# Patient Record
Sex: FEMALE | Race: WHITE | NOT HISPANIC OR LATINO | Employment: STUDENT | URBAN - NONMETROPOLITAN AREA
[De-identification: names, ages, dates, MRNs, and addresses within clinical notes are randomized per-mention and may not be internally consistent; named-entity substitution may affect disease eponyms.]

---

## 2023-01-16 ENCOUNTER — OFFICE VISIT (OUTPATIENT)
Dept: OBSTETRICS AND GYNECOLOGY | Facility: CLINIC | Age: 23
End: 2023-01-16
Payer: MEDICAID

## 2023-01-16 VITALS
BODY MASS INDEX: 22.93 KG/M2 | WEIGHT: 124.6 LBS | HEIGHT: 62 IN | SYSTOLIC BLOOD PRESSURE: 114 MMHG | DIASTOLIC BLOOD PRESSURE: 80 MMHG

## 2023-01-16 DIAGNOSIS — Z12.4 SCREENING FOR CERVICAL CANCER: ICD-10-CM

## 2023-01-16 DIAGNOSIS — Z01.419 WELL WOMAN EXAM WITH ROUTINE GYNECOLOGICAL EXAM: Primary | ICD-10-CM

## 2023-01-16 PROCEDURE — 99385 PREV VISIT NEW AGE 18-39: CPT | Performed by: PHYSICIAN ASSISTANT

## 2023-01-16 RX ORDER — ISOTRETINOIN 40 MG/1
CAPSULE ORAL
COMMUNITY

## 2023-01-16 NOTE — PROGRESS NOTES
"Subjective   Chief Complaint   Patient presents with   • Gynecologic Exam     Patient has never had a pap, No complaints       Verenice Olvera is a 22 y.o. year old  presenting to be seen for her annual gynecological exam.   Has no complaints or concerns  LMP 3 weeks ago  Using condoms for birth control    History reviewed. No pertinent past medical history.     Current Outpatient Medications:   •  ISOtretinoin (ACCUTANE) 40 MG capsule, , Disp: , Rfl:    No Known Allergies   History reviewed. No pertinent surgical history.   Social History     Socioeconomic History   • Marital status: Single   Tobacco Use   • Smoking status: Never   Substance and Sexual Activity   • Alcohol use: Never   • Drug use: Never   • Sexual activity: Yes     Partners: Male     Birth control/protection: Condom      Family History   Problem Relation Age of Onset   • No Known Problems Father    • No Known Problems Mother        Review of Systems   Constitutional: Negative for chills, diaphoresis and fever.   Gastrointestinal: Negative.    Genitourinary: Negative for difficulty urinating, dysuria, menstrual problem, pelvic pain and vaginal bleeding.           Objective   /80   Ht 157.5 cm (62\")   Wt 56.5 kg (124 lb 9.6 oz)   LMP 2022 (Approximate)   BMI 22.79 kg/m²     Physical Exam  Exam conducted with a chaperone present.   Constitutional:       Appearance: Normal appearance. She is well-developed and well-groomed.   Eyes:      General: Lids are normal.      Extraocular Movements: Extraocular movements intact.      Conjunctiva/sclera: Conjunctivae normal.   Chest:   Breasts:     Right: No inverted nipple, mass, nipple discharge, skin change or tenderness.      Left: No inverted nipple, mass, nipple discharge, skin change or tenderness.   Genitourinary:     Labia:         Right: No rash, tenderness or lesion.         Left: No rash, tenderness or lesion.       Urethra: No prolapse, urethral pain, urethral swelling or " urethral lesion.      Vagina: No vaginal discharge, tenderness or lesions.      Cervix: No cervical motion tenderness, discharge, friability or lesion.      Uterus: Not enlarged and not tender.       Adnexa:         Right: No mass or tenderness.          Left: No mass or tenderness.     Lymphadenopathy:      Upper Body:      Right upper body: No axillary adenopathy.      Left upper body: No axillary adenopathy.   Skin:     General: Skin is warm and dry.      Findings: No bruising or lesion.   Neurological:      General: No focal deficit present.      Mental Status: She is alert and oriented to person, place, and time.   Psychiatric:         Attention and Perception: Attention normal.         Mood and Affect: Mood normal.         Speech: Speech normal.         Behavior: Behavior is cooperative.            Result Review :                   Assessment and Plan  Diagnoses and all orders for this visit:    1. Well woman exam with routine gynecological exam (Primary)    2. Screening for cervical cancer  -     LIQUID-BASED PAP SMEAR, [facility] ([facility])      There are no Patient Instructions on file for this visit.           This note was electronically signed.    Nika Mcdonnell PA-C   January 16, 2023

## 2023-01-19 LAB — REF LAB TEST METHOD: NORMAL

## 2023-01-20 ENCOUNTER — TELEPHONE (OUTPATIENT)
Dept: OBSTETRICS AND GYNECOLOGY | Facility: CLINIC | Age: 23
End: 2023-01-20
Payer: COMMERCIAL

## 2023-01-30 ENCOUNTER — OFFICE VISIT (OUTPATIENT)
Dept: OBSTETRICS AND GYNECOLOGY | Facility: CLINIC | Age: 23
End: 2023-01-30
Payer: COMMERCIAL

## 2023-01-30 VITALS
BODY MASS INDEX: 22.78 KG/M2 | DIASTOLIC BLOOD PRESSURE: 80 MMHG | SYSTOLIC BLOOD PRESSURE: 120 MMHG | HEIGHT: 62 IN | WEIGHT: 123.8 LBS

## 2023-01-30 DIAGNOSIS — R87.613 HIGH GRADE SQUAMOUS INTRAEPITHELIAL LESION (HGSIL) ON CYTOLOGIC SMEAR OF CERVIX: Primary | ICD-10-CM

## 2023-01-30 LAB
B-HCG UR QL: NEGATIVE
EXPIRATION DATE: NORMAL
INTERNAL NEGATIVE CONTROL: NEGATIVE
INTERNAL POSITIVE CONTROL: POSITIVE
Lab: NORMAL

## 2023-01-30 PROCEDURE — 81025 URINE PREGNANCY TEST: CPT | Performed by: PHYSICIAN ASSISTANT

## 2023-01-30 PROCEDURE — 57454 BX/CURETT OF CERVIX W/SCOPE: CPT | Performed by: PHYSICIAN ASSISTANT

## 2023-01-30 NOTE — PROGRESS NOTES
Colposcopy    Date of procedure:  1/30/2023    Risks and benefits discussed? yes  All questions answered? yes  Consents given by the patient  Written consent obtained? yes    Pre-op indication: HGSIL- moderate and severe dysplasia  Procedure documentation:    The cervix was bathed in acetic acid.   The findings were as follows:      The transformation zone was able to be seen adequately.    acetowhite noted at 11 oclock and 4 oclock     Ectocervical biopsies were taken from 11 o'clock and 4 oclock.      An ECC was performed.                            Monsels solution tampon was applied to the biopsy sites.    Colposcopic Impression: 1. Adequate colposcopy  2. Colposcopic findings are inconsistent with PAP       Plan: Will base further treatment on pathology results.     Patient instructions:        Remove tampon in 2 hours.  Pelvic rest/no intercourse for one week. Call office if any heavy bleeding, vaginal odor, pelvic pain, cramping, fever or chills or any other concerns.       This note was electronically signed.    Nika Mcdonnell PA-C  January 30, 2023

## 2023-02-01 ENCOUNTER — PREP FOR SURGERY (OUTPATIENT)
Dept: OTHER | Facility: HOSPITAL | Age: 23
End: 2023-02-01
Payer: COMMERCIAL

## 2023-02-01 DIAGNOSIS — R87.613 HIGH GRADE SQUAMOUS INTRAEPITHELIAL CERVICAL DYSPLASIA: Primary | ICD-10-CM

## 2023-02-01 LAB — REF LAB TEST METHOD: NORMAL

## 2023-02-01 RX ORDER — SODIUM CHLORIDE 9 MG/ML
40 INJECTION, SOLUTION INTRAVENOUS AS NEEDED
Status: CANCELLED | OUTPATIENT
Start: 2023-02-01

## 2023-02-01 RX ORDER — SODIUM CHLORIDE 0.9 % (FLUSH) 0.9 %
10 SYRINGE (ML) INJECTION AS NEEDED
Status: CANCELLED | OUTPATIENT
Start: 2023-02-01

## 2023-02-01 RX ORDER — SODIUM CHLORIDE 0.9 % (FLUSH) 0.9 %
3 SYRINGE (ML) INJECTION EVERY 12 HOURS SCHEDULED
Status: CANCELLED | OUTPATIENT
Start: 2023-02-01

## 2023-02-01 NOTE — PROGRESS NOTES
Patient is informed of results of cervical biopsy confirming high-grade dysplasia.  Patient is advised of the recommendation for LEEP for treatment of high-grade dysplasia.  She voices understanding.  Case request is placed.

## 2023-02-03 PROBLEM — R87.613 HIGH GRADE SQUAMOUS INTRAEPITHELIAL CERVICAL DYSPLASIA: Status: ACTIVE | Noted: 2023-02-03

## 2023-02-14 ENCOUNTER — TELEPHONE (OUTPATIENT)
Dept: PREADMISSION TESTING | Facility: HOSPITAL | Age: 23
End: 2023-02-14

## 2023-02-21 ENCOUNTER — TELEPHONE (OUTPATIENT)
Dept: PREADMISSION TESTING | Facility: HOSPITAL | Age: 23
End: 2023-02-21

## 2023-02-22 ENCOUNTER — PRE-ADMISSION TESTING (OUTPATIENT)
Dept: PREADMISSION TESTING | Facility: HOSPITAL | Age: 23
End: 2023-02-22
Payer: COMMERCIAL

## 2023-02-22 VITALS — WEIGHT: 127 LBS | HEIGHT: 62 IN | BODY MASS INDEX: 23.37 KG/M2

## 2023-02-22 DIAGNOSIS — R87.613 HIGH GRADE SQUAMOUS INTRAEPITHELIAL CERVICAL DYSPLASIA: ICD-10-CM

## 2023-02-22 LAB
BASOPHILS # BLD AUTO: 0.05 10*3/MM3 (ref 0–0.2)
BASOPHILS NFR BLD AUTO: 1 % (ref 0–1.5)
BILIRUB UR QL STRIP: NEGATIVE
CLARITY UR: CLEAR
COLOR UR: YELLOW
DEPRECATED RDW RBC AUTO: 39.5 FL (ref 37–54)
EOSINOPHIL # BLD AUTO: 0.05 10*3/MM3 (ref 0–0.4)
EOSINOPHIL NFR BLD AUTO: 1 % (ref 0.3–6.2)
ERYTHROCYTE [DISTWIDTH] IN BLOOD BY AUTOMATED COUNT: 12.5 % (ref 12.3–15.4)
GLUCOSE UR STRIP-MCNC: NEGATIVE MG/DL
HCT VFR BLD AUTO: 35.4 % (ref 34–46.6)
HGB BLD-MCNC: 12.2 G/DL (ref 12–15.9)
HGB UR QL STRIP.AUTO: NEGATIVE
IMM GRANULOCYTES # BLD AUTO: 0.01 10*3/MM3 (ref 0–0.05)
IMM GRANULOCYTES NFR BLD AUTO: 0.2 % (ref 0–0.5)
KETONES UR QL STRIP: NEGATIVE
LEUKOCYTE ESTERASE UR QL STRIP.AUTO: NEGATIVE
LYMPHOCYTES # BLD AUTO: 1.82 10*3/MM3 (ref 0.7–3.1)
LYMPHOCYTES NFR BLD AUTO: 38.1 % (ref 19.6–45.3)
MCH RBC QN AUTO: 29.8 PG (ref 26.6–33)
MCHC RBC AUTO-ENTMCNC: 34.5 G/DL (ref 31.5–35.7)
MCV RBC AUTO: 86.3 FL (ref 79–97)
MONOCYTES # BLD AUTO: 0.5 10*3/MM3 (ref 0.1–0.9)
MONOCYTES NFR BLD AUTO: 10.5 % (ref 5–12)
NEUTROPHILS NFR BLD AUTO: 2.35 10*3/MM3 (ref 1.7–7)
NEUTROPHILS NFR BLD AUTO: 49.2 % (ref 42.7–76)
NITRITE UR QL STRIP: NEGATIVE
NRBC BLD AUTO-RTO: 0 /100 WBC (ref 0–0.2)
PH UR STRIP.AUTO: 7 [PH] (ref 5–8)
PLATELET # BLD AUTO: 235 10*3/MM3 (ref 140–450)
PMV BLD AUTO: 9.3 FL (ref 6–12)
PROT UR QL STRIP: NEGATIVE
RBC # BLD AUTO: 4.1 10*6/MM3 (ref 3.77–5.28)
SP GR UR STRIP: 1.01 (ref 1–1.03)
UROBILINOGEN UR QL STRIP: NORMAL
WBC NRBC COR # BLD: 4.78 10*3/MM3 (ref 3.4–10.8)

## 2023-02-22 PROCEDURE — 85025 COMPLETE CBC W/AUTO DIFF WBC: CPT

## 2023-02-22 PROCEDURE — 36415 COLL VENOUS BLD VENIPUNCTURE: CPT

## 2023-02-22 PROCEDURE — 81003 URINALYSIS AUTO W/O SCOPE: CPT

## 2023-02-22 NOTE — DISCHARGE INSTRUCTIONS
Introduction to anesthesia video viewed by pt in PAT.   PAT PASS GIVEN/REVIEWED WITH PT.  VERBALIZED UNDERSTANDING OF THE FOLLOWING:  DO NOT EAT, DRINK, SMOKE, USE SMOKELESS TOBACCO OR CHEW GUM AFTER MIDNIGHT THE NIGHT BEFORE SURGERY.  THIS ALSO INCLUDES HARD CANDIES AND MINTS.    DO NOT SHAVE THE AREA TO BE OPERATED ON AT LEAST 48 HOURS PRIOR TO THE PROCEDURE.  DO NOT WEAR MAKE UP OR NAIL POLISH.  DO NOT LEAVE IN ANY PIERCING OR WEAR JEWELRY THE DAY OF SURGERY.      DO NOT USE ADHESIVES IF YOU WEAR DENTURES.    DO NOT WEAR EYE CONTACTS; BRING IN YOUR GLASSES.    ONLY TAKE MEDICATION THE MORNING OF YOUR PROCEDURE IF INSTRUCTED BY YOUR SURGEON WITH ENOUGH WATER TO SWALLOW THE MEDICATION.  IF YOUR SURGEON DID NOT SPECIFY WHICH MEDICATIONS TO TAKE, YOU WILL NEED TO CALL THEIR OFFICE FOR FURTHER INSTRUCTIONS AND DO AS THEY INSTRUCT.    LEAVE ANYTHING YOU CONSIDER VALUABLE AT HOME.    YOU WILL NEED TO ARRANGE FOR SOMEONE TO DRIVE YOU HOME AFTER SURGERY.  IT IS RECOMMENDED THAT YOU DO NOT DRIVE, WORK, DRINK ALCOHOL OR MAKE MAJOR DECISIONS FOR AT LEAST 24 HOURS AFTER YOUR PROCEDURE IS COMPLETE.      THE DAY OF YOUR PROCEDURE, BRING IN THE FOLLOWING IF APPLICABLE:   PICTURE ID AND INSURANCE/MEDICARE OR MEDICAID CARDS/ANY CO-PAY THAT MAY BE DUE   COPY OF ADVANCED DIRECTIVE/LIVING WILL/POWER OR    CPAP/BIPAP/INHALERS   SKIN PREP SHEET   YOUR PREADMISSION TESTING PASS (IF NOT A PHONE HISTORY)       PAT PASS GIVEN/REVIEWED WITH PT.  VERBALIZED UNDERSTANDING OF THE FOLLOWING:  DO NOT EAT, DRINK, SMOKE, USE SMOKELESS TOBACCO OR CHEW GUM AFTER MIDNIGHT THE NIGHT BEFORE SURGERY.  THIS ALSO INCLUDES HARD CANDIES AND MINTS.    DO NOT SHAVE THE AREA TO BE OPERATED ON AT LEAST 48 HOURS PRIOR TO THE PROCEDURE.  DO NOT WEAR MAKE UP OR NAIL POLISH.  DO NOT LEAVE IN ANY PIERCING OR WEAR JEWELRY THE DAY OF SURGERY.      DO NOT USE ADHESIVES IF YOU WEAR DENTURES.    DO NOT WEAR EYE CONTACTS; BRING IN YOUR GLASSES.    ONLY TAKE  MEDICATION THE MORNING OF YOUR PROCEDURE IF INSTRUCTED BY YOUR SURGEON WITH ENOUGH WATER TO SWALLOW THE MEDICATION.  IF YOUR SURGEON DID NOT SPECIFY WHICH MEDICATIONS TO TAKE, YOU WILL NEED TO CALL THEIR OFFICE FOR FURTHER INSTRUCTIONS AND DO AS THEY INSTRUCT.    LEAVE ANYTHING YOU CONSIDER VALUABLE AT HOME.    YOU WILL NEED TO ARRANGE FOR SOMEONE TO DRIVE YOU HOME AFTER SURGERY.  IT IS RECOMMENDED THAT YOU DO NOT DRIVE, WORK, DRINK ALCOHOL OR MAKE MAJOR DECISIONS FOR AT LEAST 24 HOURS AFTER YOUR PROCEDURE IS COMPLETE.      THE DAY OF YOUR PROCEDURE, BRING IN THE FOLLOWING IF APPLICABLE:   PICTURE ID AND INSURANCE/MEDICARE OR MEDICAID CARDS/ANY CO-PAY THAT MAY BE DUE   COPY OF ADVANCED DIRECTIVE/LIVING WILL/POWER OR    CPAP/BIPAP/INHALERS   SKIN PREP SHEET   YOUR PREADMISSION TESTING PASS (IF NOT A PHONE HISTORY)

## 2023-02-28 PROCEDURE — S0260 H&P FOR SURGERY: HCPCS | Performed by: OBSTETRICS & GYNECOLOGY

## 2023-03-19 PROCEDURE — S0260 H&P FOR SURGERY: HCPCS | Performed by: OBSTETRICS & GYNECOLOGY

## 2023-03-20 NOTE — H&P
Alexander Olvera  : 2000  MRN: [id number]  CSN: [id number]    History and Physical  Subjective   Verenice Olvera is a 22 y.o. year old  who presents for surgery due to cervical dysplasia with high-grade lesion.  Patient has been seen in the office.  She had a Pap smear showing a high-grade lesion.  Colposcopy and biopsy was performed.  Her pathology returned with a high-grade lesion.  Patient is here for a LEEP procedure.    History  Past Medical History:   Diagnosis Date   • Abnormal Pap smear of cervix      No current facility-administered medications on file prior to encounter.     Current Outpatient Medications on File Prior to Encounter   Medication Sig Dispense Refill   • ISOtretinoin (ACCUTANE) 40 MG capsule        No Known Allergies  Past Surgical History:   Procedure Laterality Date   • WISDOM TOOTH EXTRACTION  14     Family History   Problem Relation Age of Onset   • No Known Problems Father    • No Known Problems Mother      Social History     Socioeconomic History   • Marital status: Single   Tobacco Use   • Smoking status: Never   Vaping Use   • Vaping Use: Never used   Substance and Sexual Activity   • Alcohol use: Never   • Drug use: Never   • Sexual activity: Defer     Partners: Male     Review of Systems  The following systems were reviewed and negative:  constitution, eyes, ENT, respiratory, cardiovascular, gastrointestinal, genitourinary, integument, breast, hematologic / lymphatic, musculoskeletal, neurological, behavioral/psych, endocrine and allergies / immunologic    Objective  Recent Vitals       2023          BP: 114/80 120/80      Weight: 56.5 kg (124 lb 9.6 oz) 56.2 kg (123 lb 12.8 oz)      BMI (Calculated): 22.8 22.6          Physical Exam:  General Appearance:  Alert and cooperative, not in any acute distress.   Head:  Atraumatic and normocephalic, without obvious abnormality.   Eyes:          PERRLA, conjunctivae and sclerae normal, no  Icterus. No pallor. Extraocular movements are within normal limits.   Ears:  Ears appear intact with no abnormalities noted.   Throat: No oral lesions, no thrush, oral mucosa moist.   Neck: Supple, trachea midline, no thyromegaly, no carotid bruit.   Back:   No kyphoscoliosis present. No tenderness to palpation,   range of motion normal.  No CVAT.   Lungs:   Chest shape is normal. Breath sounds heard bilaterally equally.  No crackles or wheezing. No Pleural rub or bronchial breathing. Normal respiratory effort.   Heart:  Normal S1 and S2, no murmur, no gallop, no rub. No JVD.   Abdomen:   Normal bowel sounds, no masses, no hepatomegaly, no splenomegaly. Soft, non-tender, no guarding, no rebound tenderness.   Extremities: Moves all extremities well, no edema, no cyanosis, no clubbing.  Normal range of motion. Normal strength and tone.   Skin: No bleeding, bruising or rash. No palpable masses noted or induration.   Psychiatric: Alert and oriented  to time, place, and person. Appropriate mood and affect. Thought content organized and appropriate judgment.       Recent Labs  Lab Results (last 7 days)     ** No results found for the last 168 hours. **           Recent Imaging  No radiology results for the last 10 days        Assessment   1. Abnormal Pap smear  2. High-grade cervical dysplasia     Plan   1. LEEP procedure of the cervix.  2. ABx and DVT prophylaxis as indicated.  3. Risks, complications, benefits, and other alternatives discussed.  4. All questions answered and pt in agreement with plan.    Maria A Alfredo M.D.  3/19/2023  20:51 EDT

## 2023-03-21 ENCOUNTER — HOSPITAL ENCOUNTER (OUTPATIENT)
Facility: HOSPITAL | Age: 23
Setting detail: HOSPITAL OUTPATIENT SURGERY
Discharge: HOME | End: 2023-03-21
Attending: OBSTETRICS & GYNECOLOGY | Admitting: OBSTETRICS & GYNECOLOGY
Payer: COMMERCIAL

## 2023-03-21 ENCOUNTER — ANESTHESIA EVENT (OUTPATIENT)
Dept: PERIOP | Facility: HOSPITAL | Age: 23
End: 2023-03-21
Payer: COMMERCIAL

## 2023-03-21 ENCOUNTER — ANESTHESIA (OUTPATIENT)
Dept: PERIOP | Facility: HOSPITAL | Age: 23
End: 2023-03-21
Payer: COMMERCIAL

## 2023-03-21 VITALS
RESPIRATION RATE: 18 BRPM | SYSTOLIC BLOOD PRESSURE: 123 MMHG | TEMPERATURE: 97.6 F | DIASTOLIC BLOOD PRESSURE: 75 MMHG | OXYGEN SATURATION: 100 % | HEART RATE: 80 BPM

## 2023-03-21 PROCEDURE — G0463 HOSPITAL OUTPT CLINIC VISIT: HCPCS | Performed by: OBSTETRICS & GYNECOLOGY

## 2023-03-21 PROCEDURE — 81025 URINE PREGNANCY TEST: CPT | Performed by: OBSTETRICS & GYNECOLOGY

## 2023-03-21 RX ORDER — SODIUM CHLORIDE, SODIUM LACTATE, POTASSIUM CHLORIDE, CALCIUM CHLORIDE 600; 310; 30; 20 MG/100ML; MG/100ML; MG/100ML; MG/100ML
1000 INJECTION, SOLUTION INTRAVENOUS CONTINUOUS
Status: DISCONTINUED | OUTPATIENT
Start: 2023-03-21 | End: 2023-03-21 | Stop reason: HOSPADM

## 2023-03-21 RX ORDER — SODIUM CHLORIDE 0.9 % (FLUSH) 0.9 %
3 SYRINGE (ML) INJECTION EVERY 12 HOURS SCHEDULED
Status: DISCONTINUED | OUTPATIENT
Start: 2023-03-21 | End: 2023-03-21 | Stop reason: HOSPADM

## 2023-03-21 RX ORDER — SODIUM CHLORIDE 9 MG/ML
40 INJECTION, SOLUTION INTRAVENOUS AS NEEDED
Status: DISCONTINUED | OUTPATIENT
Start: 2023-03-21 | End: 2023-03-21 | Stop reason: HOSPADM

## 2023-03-21 RX ORDER — SODIUM CHLORIDE 0.9 % (FLUSH) 0.9 %
10 SYRINGE (ML) INJECTION AS NEEDED
Status: DISCONTINUED | OUTPATIENT
Start: 2023-03-21 | End: 2023-03-21 | Stop reason: HOSPADM

## 2023-03-21 RX ADMIN — SODIUM CHLORIDE, POTASSIUM CHLORIDE, SODIUM LACTATE AND CALCIUM CHLORIDE 1000 ML: 600; 310; 30; 20 INJECTION, SOLUTION INTRAVENOUS at 08:57

## 2023-03-21 NOTE — ANESTHESIA PREPROCEDURE EVALUATION
Anesthesia Evaluation     Patient summary reviewed and Nursing notes reviewed   NPO Solid Status: > 8 hours  NPO Liquid Status: > 8 hours           Airway   Mallampati: II  TM distance: >3 FB  Neck ROM: full  Possible difficult intubation  Dental - normal exam     Pulmonary - negative pulmonary ROS and normal exam   Cardiovascular - negative cardio ROS and normal exam        Neuro/Psych- negative ROS  GI/Hepatic/Renal/Endo - negative ROS     Musculoskeletal (-) negative ROS    Abdominal  - normal exam   Substance History - negative use     OB/GYN negative ob/gyn ROS         Other - negative ROS                       Anesthesia Plan    ASA 1     MAC     intravenous induction     Pre-procedure education provided  Plan discussed with CRNA.        CODE STATUS:

## 2023-03-21 NOTE — NURSING NOTE
1114 - MD at bedside, discussed with pt the schedule is taking longer than expected today.  Pt decided to reschedule procedure and is aware to call office for new date.  IV removed.    1135 - pt DC with boyfriend and belongings.  No questions per pt. S Barbara RN

## 2023-03-24 ENCOUNTER — PREP FOR SURGERY (OUTPATIENT)
Dept: OTHER | Facility: HOSPITAL | Age: 23
End: 2023-03-24
Payer: COMMERCIAL

## 2023-03-24 DIAGNOSIS — R87.613 HIGH GRADE SQUAMOUS INTRAEPITHELIAL CERVICAL DYSPLASIA: Primary | ICD-10-CM

## 2023-03-24 RX ORDER — SODIUM CHLORIDE 9 MG/ML
40 INJECTION, SOLUTION INTRAVENOUS AS NEEDED
OUTPATIENT
Start: 2023-03-24

## 2023-03-24 RX ORDER — SODIUM CHLORIDE 0.9 % (FLUSH) 0.9 %
3 SYRINGE (ML) INJECTION EVERY 12 HOURS SCHEDULED
OUTPATIENT
Start: 2023-03-24

## 2023-03-24 RX ORDER — SODIUM CHLORIDE 0.9 % (FLUSH) 0.9 %
10 SYRINGE (ML) INJECTION AS NEEDED
OUTPATIENT
Start: 2023-03-24

## 2023-04-13 ENCOUNTER — PRE-ADMISSION TESTING (OUTPATIENT)
Dept: PREADMISSION TESTING | Facility: HOSPITAL | Age: 23
End: 2023-04-13
Payer: COMMERCIAL

## 2023-04-13 VITALS — HEIGHT: 62 IN | WEIGHT: 127.8 LBS | BODY MASS INDEX: 23.52 KG/M2

## 2023-04-13 DIAGNOSIS — R87.613 HIGH GRADE SQUAMOUS INTRAEPITHELIAL CERVICAL DYSPLASIA: ICD-10-CM

## 2023-04-13 LAB
BASOPHILS # BLD AUTO: 0.04 10*3/MM3 (ref 0–0.2)
BASOPHILS NFR BLD AUTO: 0.7 % (ref 0–1.5)
BILIRUB UR QL STRIP: NEGATIVE
CLARITY UR: CLEAR
COLOR UR: YELLOW
DEPRECATED RDW RBC AUTO: 41.3 FL (ref 37–54)
EOSINOPHIL # BLD AUTO: 0.07 10*3/MM3 (ref 0–0.4)
EOSINOPHIL NFR BLD AUTO: 1.2 % (ref 0.3–6.2)
ERYTHROCYTE [DISTWIDTH] IN BLOOD BY AUTOMATED COUNT: 12.6 % (ref 12.3–15.4)
GLUCOSE UR STRIP-MCNC: NEGATIVE MG/DL
HCT VFR BLD AUTO: 41.3 % (ref 34–46.6)
HGB BLD-MCNC: 13.8 G/DL (ref 12–15.9)
HGB UR QL STRIP.AUTO: NEGATIVE
IMM GRANULOCYTES # BLD AUTO: 0.01 10*3/MM3 (ref 0–0.05)
IMM GRANULOCYTES NFR BLD AUTO: 0.2 % (ref 0–0.5)
KETONES UR QL STRIP: NEGATIVE
LEUKOCYTE ESTERASE UR QL STRIP.AUTO: NEGATIVE
LYMPHOCYTES # BLD AUTO: 2.2 10*3/MM3 (ref 0.7–3.1)
LYMPHOCYTES NFR BLD AUTO: 37.4 % (ref 19.6–45.3)
MCH RBC QN AUTO: 30.4 PG (ref 26.6–33)
MCHC RBC AUTO-ENTMCNC: 33.4 G/DL (ref 31.5–35.7)
MCV RBC AUTO: 91 FL (ref 79–97)
MONOCYTES # BLD AUTO: 0.54 10*3/MM3 (ref 0.1–0.9)
MONOCYTES NFR BLD AUTO: 9.2 % (ref 5–12)
NEUTROPHILS NFR BLD AUTO: 3.02 10*3/MM3 (ref 1.7–7)
NEUTROPHILS NFR BLD AUTO: 51.3 % (ref 42.7–76)
NITRITE UR QL STRIP: NEGATIVE
NRBC BLD AUTO-RTO: 0 /100 WBC (ref 0–0.2)
PH UR STRIP.AUTO: 7 [PH] (ref 5–8)
PLATELET # BLD AUTO: 236 10*3/MM3 (ref 140–450)
PMV BLD AUTO: 9.6 FL (ref 6–12)
PROT UR QL STRIP: NEGATIVE
RBC # BLD AUTO: 4.54 10*6/MM3 (ref 3.77–5.28)
SP GR UR STRIP: 1.02 (ref 1–1.03)
UROBILINOGEN UR QL STRIP: NORMAL
WBC NRBC COR # BLD: 5.88 10*3/MM3 (ref 3.4–10.8)

## 2023-04-13 PROCEDURE — 81003 URINALYSIS AUTO W/O SCOPE: CPT

## 2023-04-13 PROCEDURE — 36415 COLL VENOUS BLD VENIPUNCTURE: CPT

## 2023-04-13 PROCEDURE — 85025 COMPLETE CBC W/AUTO DIFF WBC: CPT

## 2023-04-26 PROCEDURE — S0260 H&P FOR SURGERY: HCPCS | Performed by: OBSTETRICS & GYNECOLOGY

## 2023-04-26 NOTE — H&P
SEB Alexander Olvera  : 2000  MRN: [id number]  [facility]: [id number]    History and Physical  Subjective   Verenice Olvera is a 22 y.o. year old  who presents for surgery due to cervical dysplasia with high-grade lesion.  Patient had colposcopy and biopsy performed secondary to a high-grade Pap smear.  Her pathology returned high-grade.  She is here for a LEEP procedure.  Her ECC was negative.    History  Past Medical History:   Diagnosis Date   • Abnormal Pap smear of cervix    • Acne      No current facility-administered medications on file prior to encounter.     Current Outpatient Medications on File Prior to Encounter   Medication Sig Dispense Refill   • ISOtretinoin (ACCUTANE) 40 MG capsule Take 1 capsule by mouth Daily As Needed.       No Known Allergies  Past Surgical History:   Procedure Laterality Date   • WISDOM TOOTH EXTRACTION  14     Family History   Problem Relation Age of Onset   • No Known Problems Father    • No Known Problems Mother      Social History     Socioeconomic History   • Marital status: Single   Tobacco Use   • Smoking status: Never   • Smokeless tobacco: Never   Vaping Use   • Vaping Use: Never used   Substance and Sexual Activity   • Alcohol use: Never   • Drug use: Never   • Sexual activity: Defer     Review of Systems  The following systems were reviewed and negative:  constitution, eyes, ENT, respiratory, cardiovascular, gastrointestinal, genitourinary, integument, breast, hematologic / lymphatic, musculoskeletal, neurological, behavioral/psych, endocrine and allergies / immunologic    Objective  Recent Vitals       2023 2023 3/21/2023       BP: 120/80 -- 123/75     Pulse: -- -- 80     Temp: -- -- 97.6 °F (36.4 °C)     Weight: 56.2 kg (123 lb 12.8 oz) 57.6 kg (127 lb) --     BMI (Calculated): 22.6 23.2 --         Physical Exam:  General Appearance:  Alert and cooperative, not in any acute distress.   Head:  Atraumatic and  normocephalic, without obvious abnormality.   Eyes:          PERRLA, conjunctivae and sclerae normal, no Icterus. No pallor. Extraocular movements are within normal limits.   Ears:  Ears appear intact with no abnormalities noted.   Throat: No oral lesions, no thrush, oral mucosa moist.   Neck: Supple, trachea midline, no thyromegaly, no carotid bruit.   Back:   No kyphoscoliosis present. No tenderness to palpation,   range of motion normal.  No CVAT.   Lungs:   Chest shape is normal. Breath sounds heard bilaterally equally.  No crackles or wheezing. No Pleural rub or bronchial breathing. Normal respiratory effort.   Heart:  Normal S1 and S2, no murmur, no gallop, no rub. No JVD.   Abdomen:   Normal bowel sounds, no masses, no hepatomegaly, no splenomegaly. Soft, non-tender, no guarding, no rebound tenderness.   Extremities: Moves all extremities well, no edema, no cyanosis, no clubbing.  Normal range of motion. Normal strength and tone.   Skin: No bleeding, bruising or rash. No palpable masses noted or induration.   Psychiatric: Alert and oriented  to time, place, and person. Appropriate mood and affect. Thought content organized and appropriate judgment.       Recent Labs  Lab Results (last 7 days)     ** No results found for the last 168 hours. **           Recent Imaging  No radiology results for the last 10 days        Assessment   1. High-grade cervical dysplasia     Plan   1. LEEP procedure.  2. ABx and DVT prophylaxis as indicated.  3. Risks, complications, benefits, and other alternatives discussed.  4. All questions answered and pt in agreement with plan.    Maria A Alfredo M.D.  4/26/2023  07:30 EDT

## 2023-04-27 ENCOUNTER — HOSPITAL ENCOUNTER (OUTPATIENT)
Facility: HOSPITAL | Age: 23
Setting detail: HOSPITAL OUTPATIENT SURGERY
Discharge: HOME | End: 2023-04-27
Attending: OBSTETRICS & GYNECOLOGY | Admitting: OBSTETRICS & GYNECOLOGY
Payer: COMMERCIAL

## 2023-04-27 ENCOUNTER — ANESTHESIA EVENT (OUTPATIENT)
Dept: PERIOP | Facility: HOSPITAL | Age: 23
End: 2023-04-27
Payer: COMMERCIAL

## 2023-04-27 ENCOUNTER — ANESTHESIA (OUTPATIENT)
Dept: PERIOP | Facility: HOSPITAL | Age: 23
End: 2023-04-27
Payer: COMMERCIAL

## 2023-04-27 VITALS
RESPIRATION RATE: 18 BRPM | OXYGEN SATURATION: 100 % | DIASTOLIC BLOOD PRESSURE: 88 MMHG | HEART RATE: 85 BPM | SYSTOLIC BLOOD PRESSURE: 124 MMHG | TEMPERATURE: 97.7 F

## 2023-04-27 DIAGNOSIS — R87.613 HIGH GRADE SQUAMOUS INTRAEPITHELIAL CERVICAL DYSPLASIA: ICD-10-CM

## 2023-04-27 LAB
B-HCG UR QL: NEGATIVE
EXPIRATION DATE: NORMAL
INTERNAL NEGATIVE CONTROL: NORMAL
INTERNAL POSITIVE CONTROL: NORMAL
Lab: NORMAL

## 2023-04-27 PROCEDURE — 81025 URINE PREGNANCY TEST: CPT | Performed by: OBSTETRICS & GYNECOLOGY

## 2023-04-27 PROCEDURE — 25010000002 ONDANSETRON PER 1 MG: Performed by: NURSE ANESTHETIST, CERTIFIED REGISTERED

## 2023-04-27 PROCEDURE — 25010000002 DEXAMETHASONE PER 1 MG: Performed by: NURSE ANESTHETIST, CERTIFIED REGISTERED

## 2023-04-27 PROCEDURE — 0 LIDOCAINE 1 % SOLUTION: Performed by: OBSTETRICS & GYNECOLOGY

## 2023-04-27 PROCEDURE — 57522 CONIZATION OF CERVIX: CPT | Performed by: OBSTETRICS & GYNECOLOGY

## 2023-04-27 PROCEDURE — 25010000002 PROPOFOL 10 MG/ML EMULSION: Performed by: NURSE ANESTHETIST, CERTIFIED REGISTERED

## 2023-04-27 PROCEDURE — 25010000002 MIDAZOLAM PER 1MG: Performed by: NURSE ANESTHETIST, CERTIFIED REGISTERED

## 2023-04-27 PROCEDURE — 25010000002 KETOROLAC TROMETHAMINE PER 15 MG: Performed by: NURSE ANESTHETIST, CERTIFIED REGISTERED

## 2023-04-27 PROCEDURE — 25010000002 FENTANYL CITRATE (PF) 50 MCG/ML SOLUTION: Performed by: NURSE ANESTHETIST, CERTIFIED REGISTERED

## 2023-04-27 RX ORDER — LIDOCAINE HYDROCHLORIDE 20 MG/ML
INJECTION, SOLUTION INTRAVENOUS AS NEEDED
Status: DISCONTINUED | OUTPATIENT
Start: 2023-04-27 | End: 2023-04-27 | Stop reason: SURG

## 2023-04-27 RX ORDER — SODIUM CHLORIDE, SODIUM LACTATE, POTASSIUM CHLORIDE, CALCIUM CHLORIDE 600; 310; 30; 20 MG/100ML; MG/100ML; MG/100ML; MG/100ML
1000 INJECTION, SOLUTION INTRAVENOUS CONTINUOUS
Status: DISCONTINUED | OUTPATIENT
Start: 2023-04-27 | End: 2023-04-27 | Stop reason: HOSPADM

## 2023-04-27 RX ORDER — SODIUM CHLORIDE 0.9 % (FLUSH) 0.9 %
3 SYRINGE (ML) INJECTION EVERY 12 HOURS SCHEDULED
Status: DISCONTINUED | OUTPATIENT
Start: 2023-04-27 | End: 2023-04-27 | Stop reason: HOSPADM

## 2023-04-27 RX ORDER — SODIUM CHLORIDE 0.9 % (FLUSH) 0.9 %
10 SYRINGE (ML) INJECTION AS NEEDED
Status: DISCONTINUED | OUTPATIENT
Start: 2023-04-27 | End: 2023-04-27 | Stop reason: HOSPADM

## 2023-04-27 RX ORDER — IBUPROFEN 800 MG/1
800 TABLET ORAL EVERY 8 HOURS PRN
Qty: 30 TABLET | Refills: 0 | Status: SHIPPED | OUTPATIENT
Start: 2023-04-27

## 2023-04-27 RX ORDER — ONDANSETRON 2 MG/ML
INJECTION INTRAMUSCULAR; INTRAVENOUS AS NEEDED
Status: DISCONTINUED | OUTPATIENT
Start: 2023-04-27 | End: 2023-04-27 | Stop reason: SURG

## 2023-04-27 RX ORDER — FERRIC SUBSULFATE 0.21 G/G
LIQUID TOPICAL AS NEEDED
Status: DISCONTINUED | OUTPATIENT
Start: 2023-04-27 | End: 2023-04-27 | Stop reason: HOSPADM

## 2023-04-27 RX ORDER — ONDANSETRON 2 MG/ML
4 INJECTION INTRAMUSCULAR; INTRAVENOUS ONCE AS NEEDED
Status: DISCONTINUED | OUTPATIENT
Start: 2023-04-27 | End: 2023-04-27 | Stop reason: HOSPADM

## 2023-04-27 RX ORDER — MIDAZOLAM HYDROCHLORIDE 2 MG/2ML
INJECTION, SOLUTION INTRAMUSCULAR; INTRAVENOUS AS NEEDED
Status: DISCONTINUED | OUTPATIENT
Start: 2023-04-27 | End: 2023-04-27 | Stop reason: SURG

## 2023-04-27 RX ORDER — ACETAMINOPHEN 500 MG
1000 TABLET ORAL EVERY 8 HOURS PRN
Qty: 60 TABLET | Refills: 0 | Status: SHIPPED | OUTPATIENT
Start: 2023-04-27

## 2023-04-27 RX ORDER — ONDANSETRON HYDROCHLORIDE 8 MG/1
8 TABLET, FILM COATED ORAL EVERY 8 HOURS PRN
Qty: 15 TABLET | Refills: 0 | Status: SHIPPED | OUTPATIENT
Start: 2023-04-27

## 2023-04-27 RX ORDER — FENTANYL CITRATE 50 UG/ML
INJECTION, SOLUTION INTRAMUSCULAR; INTRAVENOUS AS NEEDED
Status: DISCONTINUED | OUTPATIENT
Start: 2023-04-27 | End: 2023-04-27 | Stop reason: SURG

## 2023-04-27 RX ORDER — LIDOCAINE HYDROCHLORIDE 10 MG/ML
INJECTION, SOLUTION INFILTRATION; PERINEURAL AS NEEDED
Status: DISCONTINUED | OUTPATIENT
Start: 2023-04-27 | End: 2023-04-27 | Stop reason: HOSPADM

## 2023-04-27 RX ORDER — PROMETHAZINE HYDROCHLORIDE 25 MG/1
12.5 TABLET ORAL ONCE AS NEEDED
Status: DISCONTINUED | OUTPATIENT
Start: 2023-04-27 | End: 2023-04-27 | Stop reason: HOSPADM

## 2023-04-27 RX ORDER — MAGNESIUM HYDROXIDE 1200 MG/15ML
LIQUID ORAL AS NEEDED
Status: DISCONTINUED | OUTPATIENT
Start: 2023-04-27 | End: 2023-04-27 | Stop reason: HOSPADM

## 2023-04-27 RX ORDER — PROPOFOL 10 MG/ML
VIAL (ML) INTRAVENOUS AS NEEDED
Status: DISCONTINUED | OUTPATIENT
Start: 2023-04-27 | End: 2023-04-27 | Stop reason: SURG

## 2023-04-27 RX ORDER — SODIUM CHLORIDE 9 MG/ML
40 INJECTION, SOLUTION INTRAVENOUS AS NEEDED
Status: DISCONTINUED | OUTPATIENT
Start: 2023-04-27 | End: 2023-04-27 | Stop reason: HOSPADM

## 2023-04-27 RX ORDER — DEXAMETHASONE SODIUM PHOSPHATE 4 MG/ML
INJECTION, SOLUTION INTRA-ARTICULAR; INTRALESIONAL; INTRAMUSCULAR; INTRAVENOUS; SOFT TISSUE AS NEEDED
Status: DISCONTINUED | OUTPATIENT
Start: 2023-04-27 | End: 2023-04-27 | Stop reason: SURG

## 2023-04-27 RX ORDER — KETOROLAC TROMETHAMINE 30 MG/ML
INJECTION, SOLUTION INTRAMUSCULAR; INTRAVENOUS AS NEEDED
Status: DISCONTINUED | OUTPATIENT
Start: 2023-04-27 | End: 2023-04-27 | Stop reason: SURG

## 2023-04-27 RX ORDER — ONDANSETRON 4 MG/1
4 TABLET, FILM COATED ORAL ONCE AS NEEDED
Status: DISCONTINUED | OUTPATIENT
Start: 2023-04-27 | End: 2023-04-27 | Stop reason: HOSPADM

## 2023-04-27 RX ORDER — IODINE SOLUTION STRONG 5% (LUGOL'S) 5 %
SOLUTION ORAL AS NEEDED
Status: DISCONTINUED | OUTPATIENT
Start: 2023-04-27 | End: 2023-04-27 | Stop reason: HOSPADM

## 2023-04-27 RX ADMIN — MIDAZOLAM HYDROCHLORIDE 2 MG: 1 INJECTION, SOLUTION INTRAMUSCULAR; INTRAVENOUS at 12:36

## 2023-04-27 RX ADMIN — PROPOFOL 100 MG: 10 INJECTION, EMULSION INTRAVENOUS at 12:41

## 2023-04-27 RX ADMIN — ONDANSETRON 4 MG: 2 INJECTION INTRAMUSCULAR; INTRAVENOUS at 12:48

## 2023-04-27 RX ADMIN — SODIUM CHLORIDE, POTASSIUM CHLORIDE, SODIUM LACTATE AND CALCIUM CHLORIDE 1000 ML: 600; 310; 30; 20 INJECTION, SOLUTION INTRAVENOUS at 10:06

## 2023-04-27 RX ADMIN — LIDOCAINE HYDROCHLORIDE 50 MG: 20 INJECTION, SOLUTION INTRAVENOUS at 12:41

## 2023-04-27 RX ADMIN — DEXAMETHASONE SODIUM PHOSPHATE 4 MG: 4 INJECTION, SOLUTION INTRAMUSCULAR; INTRAVENOUS at 12:48

## 2023-04-27 RX ADMIN — FENTANYL CITRATE 50 MCG: 50 INJECTION INTRAMUSCULAR; INTRAVENOUS at 12:36

## 2023-04-27 RX ADMIN — PROPOFOL 100 MCG/KG/MIN: 10 INJECTION, EMULSION INTRAVENOUS at 12:41

## 2023-04-27 RX ADMIN — KETOROLAC TROMETHAMINE 15 MG: 30 INJECTION, SOLUTION INTRAMUSCULAR at 12:48

## 2023-04-27 NOTE — OP NOTE
Alexander Olvera  : 2000  MRN: [id number]  [facility]: [id number]  Date: 2023    Operative Report    Pre-op Diagnosis:  High grade squamous intraepithelial cervical dysplasia [R87.613]     Post-op Diagnosis:  Post-Op Diagnosis Codes:     * High grade squamous intraepithelial cervical dysplasia [R87.613]     Procedure: Procedure(s):  LOOP ELECTROCAUTERY EXCISION PROCEDURE     Surgeon: Maria A Alfredo M.D.     Assist: Staff was responsible for performing the following activities: Retraction and Suction and their skilled assistance was necessary for the success of this case.     OR Staff: Circulator: Michaela Lund RN  Scrub Person: Chula Lord PCT     Anesthesia:  IV sedation with 1% lidocaine paracervical block     Estimated Blood Loss:  5 mls     Drains: Yi      Specimens:  Ectocervix     Findings: Grossly normal-appearing cervix.     Complications: none       Description of Procedure:  After the appropriate time out and after adequate dosing of her anesthesia, the patient was prepped and draped in the usual sterile fashion.  She was placed in the dorsal lithotomy position using Kishore stirrups.  The bladder was drained with a red rubber catheter per nursing.  An insulated speculum was placed in the vagina.  The cervix was injected at the 3 and 9 o'clock positions with 1% lidocaine plain without any complications.  Lugol solution was applied to the cervix.  A loop electrosurgical excisional procedure was performed of the cervix being sure to incorporate all non-staining areas.  A top hat of the endocervix was not performed. The base was cauterized with rollerball cautery.  Hemostasis was achieved with Ferric Subsulfate.   The cervix was noted to be hemostatic at the end of the procedure.  The patient tolerated the procedure well.  There were no complications.  All instruments and sponge counts were correct at the end of the procedure.  The patient was taken to postoperative recovery room  in stable condition.    Maria A Alfredo M.D.  4/27/2023  13:01 EDT

## 2023-04-27 NOTE — ANESTHESIA POSTPROCEDURE EVALUATION
Patient: Verenice Olvera    Procedure Summary     Date: 04/27/23 Room / Location: [facility] OR 2 /  [facility]    Anesthesia Start: 1234 Anesthesia Stop: 1309    Procedure: LOOP ELECTROCAUTERY EXCISION PROCEDURE (Vagina) Diagnosis:       High grade squamous intraepithelial cervical dysplasia      (High grade squamous intraepithelial cervical dysplasia [R87.613])    Surgeons: Maria A Alfredo MD Provider: Cheryl Parish CRNA    Anesthesia Type: MAC ASA Status: 1          Anesthesia Type: MAC    Vitals  Vitals Value Taken Time   BP 98/55 04/27/23 1310   Temp 97.7 °F (36.5 °C) 04/27/23 1310   Pulse 61 04/27/23 1310   Resp 16 04/27/23 1310   SpO2 100 % 04/27/23 1310           Post Anesthesia Care and Evaluation    Patient location during evaluation: PHASE II  Patient participation: complete - patient participated  Level of consciousness: awake and alert  Pain score: 0  Pain management: satisfactory to patient    Airway patency: patent  Anesthetic complications: No anesthetic complications  PONV Status: none  Cardiovascular status: acceptable and stable  Respiratory status: acceptable  Hydration status: acceptable    Comments: Vitals signs as noted in nursing documentation as per protocol.

## 2023-04-27 NOTE — ANESTHESIA PREPROCEDURE EVALUATION
Anesthesia Evaluation     Patient summary reviewed and Nursing notes reviewed   NPO Solid Status: > 8 hours  NPO Liquid Status: > 8 hours           Airway   Mallampati: II  TM distance: >3 FB  Neck ROM: full  Possible difficult intubation  Dental - normal exam     Pulmonary - negative pulmonary ROS and normal exam   Cardiovascular - negative cardio ROS and normal exam  Exercise tolerance: good (4-7 METS)        Neuro/Psych- negative ROS  GI/Hepatic/Renal/Endo - negative ROS     Musculoskeletal (-) negative ROS    Abdominal  - normal exam   Substance History - negative use     OB/GYN negative ob/gyn ROS         Other - negative ROS                         Anesthesia Plan    ASA 1     MAC     (Risks and benefits discussed including risk of aspiration, recall and dental damage. All patient questions answered.    Will continue with plan of care.)  intravenous induction     Pre-procedure education provided  Plan discussed with CRNA.        CODE STATUS:

## 2023-04-27 NOTE — DISCHARGE INSTRUCTIONS
No pushing, pulling, tugging,  heavy lifting, or strenuous activity.  No major decision making, driving, or drinking alcoholic beverages for 24 hours. ( due to the medications you have  received)  Always use good hand hygiene/washing techniques.  NO driving while taking pain medications.    Pelvic rest is best described as not putting anything in your vagina. This includes tampons, douching, tub bathing or sexual activity.       To assist you in voiding:  Drink plenty of fluids  Listen to running water while attempting to void.    If you are unable to urinate and you have an uncomfortable urge to void or it has been   6 hours since you were discharged, return to the Emergency Room

## 2023-05-03 LAB — REF LAB TEST METHOD: NORMAL

## 2023-05-12 ENCOUNTER — OFFICE VISIT (OUTPATIENT)
Dept: OBSTETRICS AND GYNECOLOGY | Facility: CLINIC | Age: 23
End: 2023-05-12
Payer: COMMERCIAL

## 2023-05-12 VITALS
DIASTOLIC BLOOD PRESSURE: 68 MMHG | HEIGHT: 62 IN | WEIGHT: 125.4 LBS | SYSTOLIC BLOOD PRESSURE: 112 MMHG | BODY MASS INDEX: 23.08 KG/M2

## 2023-05-12 DIAGNOSIS — Z98.890 S/P LEEP: ICD-10-CM

## 2023-05-12 DIAGNOSIS — Z09 POSTOPERATIVE FOLLOW-UP: Primary | ICD-10-CM

## 2023-05-12 NOTE — PROGRESS NOTES
Subjective   Chief Complaint   Patient presents with   • Post-op     2 weeks post-op LEEP, doing well       Verenice Olvera is a 22 y.o. year old  presenting to be seen for post op visit  Has done well post LEEP. No bleeding or discharge  No pain or cramping  Pathology LSIL with negative margins    Past Medical History:   Diagnosis Date   • Abnormal Pap smear of cervix    • Acne    • HPV (human papilloma virus) infection         Current Outpatient Medications:   •  acetaminophen (TYLENOL) 500 MG tablet, Take 2 tablets by mouth Every 8 (Eight) Hours As Needed for Mild Pain or Moderate Pain., Disp: 60 tablet, Rfl: 0  •  ibuprofen (ADVIL,MOTRIN) 800 MG tablet, Take 1 tablet by mouth Every 8 (Eight) Hours As Needed for Mild Pain., Disp: 30 tablet, Rfl: 0  •  ISOtretinoin (ACCUTANE) 40 MG capsule, Take 1 capsule by mouth Daily As Needed., Disp: , Rfl:   •  ondansetron (ZOFRAN) 8 MG tablet, Take 1 tablet by mouth Every 8 (Eight) Hours As Needed for Nausea or Vomiting., Disp: 15 tablet, Rfl: 0   No Known Allergies   Past Surgical History:   Procedure Laterality Date   • CERVICAL BIOPSY  W/ LOOP ELECTRODE EXCISION  23   • LEEP N/A 2023    Procedure: LOOP ELECTROCAUTERY EXCISION PROCEDURE;  Surgeon: Maria A Alrfedo MD;  Location: [facility];  Service: Obstetrics/Gynecology;  Laterality: N/A;   • WISDOM TOOTH EXTRACTION  14      Social History     Socioeconomic History   • Marital status: Single   Tobacco Use   • Smoking status: Never   • Smokeless tobacco: Never   Vaping Use   • Vaping Use: Never used   Substance and Sexual Activity   • Alcohol use: Never   • Drug use: Never   • Sexual activity: Defer      Family History   Problem Relation Age of Onset   • No Known Problems Father    • No Known Problems Mother        Review of Systems   Constitutional: Negative for chills, diaphoresis and fever.   Gastrointestinal: Negative for constipation, diarrhea, nausea and vomiting.   Genitourinary: Negative  "for difficulty urinating, dysuria, pelvic pain, vaginal bleeding and vaginal discharge.           Objective   /68   Ht 157.5 cm (62\")   Wt 56.9 kg (125 lb 6.4 oz)   LMP 04/21/2023 (Exact Date)   BMI 22.94 kg/m²     Physical Exam  Constitutional:       Appearance: Normal appearance. She is well-developed and well-groomed.   Eyes:      General: Lids are normal.      Extraocular Movements: Extraocular movements intact.      Conjunctiva/sclera: Conjunctivae normal.   Neurological:      General: No focal deficit present.      Mental Status: She is alert and oriented to person, place, and time.   Psychiatric:         Attention and Perception: Attention normal.         Mood and Affect: Mood normal.         Speech: Speech normal.         Behavior: Behavior is cooperative.            Result Review :           TISSUE EXAM, [facility]) (04/27/2023 12:58)  TISSUE EXAM, [facility]) (01/30/2023 14:12)  LIQUID-BASED PAP SMEAR, [facility]) (01/16/2023 11:16)          Assessment and Plan  Diagnoses and all orders for this visit:    1. Postoperative follow-up (Primary)    2. S/P LEEP      Patient Instructions   RTO 6 months for pap             This note was electronically signed.    Nika Mcdonnell PA-C   May 12, 2023  "

## 2023-11-13 ENCOUNTER — OFFICE VISIT (OUTPATIENT)
Dept: OBSTETRICS AND GYNECOLOGY | Facility: CLINIC | Age: 23
End: 2023-11-13
Payer: COMMERCIAL

## 2023-11-13 VITALS
BODY MASS INDEX: 23.63 KG/M2 | HEIGHT: 62 IN | WEIGHT: 128.4 LBS | SYSTOLIC BLOOD PRESSURE: 112 MMHG | DIASTOLIC BLOOD PRESSURE: 80 MMHG

## 2023-11-13 DIAGNOSIS — Z98.890 S/P LEEP: Primary | ICD-10-CM

## 2023-11-13 DIAGNOSIS — Z87.410 HISTORY OF CERVICAL DYSPLASIA: ICD-10-CM

## 2023-11-13 PROCEDURE — 99213 OFFICE O/P EST LOW 20 MIN: CPT | Performed by: PHYSICIAN ASSISTANT

## 2023-11-18 LAB — REF LAB TEST METHOD: NORMAL

## 2024-04-18 ENCOUNTER — OFFICE VISIT (OUTPATIENT)
Dept: OBSTETRICS AND GYNECOLOGY | Facility: CLINIC | Age: 24
End: 2024-04-18
Payer: COMMERCIAL

## 2024-04-18 VITALS
DIASTOLIC BLOOD PRESSURE: 72 MMHG | BODY MASS INDEX: 22.93 KG/M2 | WEIGHT: 124.6 LBS | HEIGHT: 62 IN | SYSTOLIC BLOOD PRESSURE: 100 MMHG

## 2024-04-18 DIAGNOSIS — Z98.890 S/P LEEP: Primary | ICD-10-CM

## 2024-04-18 DIAGNOSIS — Z87.410 HISTORY OF CERVICAL DYSPLASIA: ICD-10-CM

## 2024-04-18 NOTE — PROGRESS NOTES
"Subjective   Chief Complaint   Patient presents with    Follow-up     Repeat pap       Verenice Olvera is a 23 y.o. year old  presenting to be seen for pap.  LEEP 2023 DEEPA 1 with negative margins  Follow up pap 2023 ASCUS and negative high risk HPV    Past Medical History:   Diagnosis Date    Abnormal Pap smear of cervix     Acne     HPV (human papilloma virus) infection         Current Outpatient Medications:     ISOtretinoin (ACCUTANE) 40 MG capsule, Take 1 capsule by mouth Daily As Needed., Disp: , Rfl:    No Known Allergies   Past Surgical History:   Procedure Laterality Date    CERVICAL BIOPSY  W/ LOOP ELECTRODE EXCISION  23    LEEP N/A 2023    Procedure: LOOP ELECTROCAUTERY EXCISION PROCEDURE;  Surgeon: Maria A Alfredo MD;  Location: [facility];  Service: Obstetrics/Gynecology;  Laterality: N/A;    WISDOM TOOTH EXTRACTION  14      Social History     Socioeconomic History    Marital status: Single   Tobacco Use    Smoking status: Never    Smokeless tobacco: Never   Vaping Use    Vaping status: Never Used   Substance and Sexual Activity    Alcohol use: Never    Drug use: Never    Sexual activity: Yes     Partners: Male     Birth control/protection: Condom      Family History   Problem Relation Age of Onset    No Known Problems Father     No Known Problems Mother        Review of Systems   Constitutional:  Negative for chills, diaphoresis and fever.   Gastrointestinal:  Negative for constipation, diarrhea, nausea and vomiting.   Genitourinary:  Negative for difficulty urinating, dysuria, menstrual problem and pelvic pain.           Objective   /72   Ht 157.5 cm (62\")   Wt 56.5 kg (124 lb 9.6 oz)   LMP 2024 (Exact Date)   BMI 22.79 kg/m²     Physical Exam  Exam conducted with a chaperone present.   Constitutional:       Appearance: Normal appearance. She is well-developed and well-groomed.   Eyes:      General: Lids are normal.      Extraocular Movements: " Extraocular movements intact.      Conjunctiva/sclera: Conjunctivae normal.   Genitourinary:     Labia:         Right: No rash, tenderness or lesion.         Left: No rash, tenderness or lesion.       Urethra: No prolapse, urethral pain, urethral swelling or urethral lesion.      Vagina: No vaginal discharge, tenderness or lesions.      Cervix: No cervical motion tenderness, discharge, friability or lesion.   Neurological:      Mental Status: She is alert.   Psychiatric:         Attention and Perception: Attention normal.         Mood and Affect: Mood normal.         Speech: Speech normal.         Behavior: Behavior is cooperative.            Result Review :                   Assessment and Plan  Diagnoses and all orders for this visit:    1. S/P LEEP (Primary)  -     LIQUID-BASED PAP SMEAR WITH HPV GENOTYPING IF ASCUS ([facility])    2. History of cervical dysplasia  -     LIQUID-BASED PAP SMEAR WITH HPV GENOTYPING IF ASCUS ([facility])      Patient Instructions   If pap normal next pap 1 year            This note was electronically signed.    Nika Mcdonnell PA-C   April 18, 2024

## 2024-04-25 LAB — REF LAB TEST METHOD: NORMAL

## 2025-04-21 ENCOUNTER — OFFICE VISIT (OUTPATIENT)
Dept: OBSTETRICS AND GYNECOLOGY | Facility: CLINIC | Age: 25
End: 2025-04-21
Payer: COMMERCIAL

## 2025-04-21 VITALS
SYSTOLIC BLOOD PRESSURE: 104 MMHG | WEIGHT: 134.6 LBS | DIASTOLIC BLOOD PRESSURE: 70 MMHG | HEIGHT: 63 IN | BODY MASS INDEX: 23.85 KG/M2

## 2025-04-21 DIAGNOSIS — Z12.4 SCREENING FOR CERVICAL CANCER: ICD-10-CM

## 2025-04-21 DIAGNOSIS — Z01.419 WELL WOMAN EXAM WITH ROUTINE GYNECOLOGICAL EXAM: Primary | ICD-10-CM

## 2025-04-21 RX ORDER — SPIRONOLACTONE 50 MG/1
TABLET, FILM COATED ORAL
COMMUNITY
Start: 2025-02-03

## 2025-04-21 NOTE — PROGRESS NOTES
"Subjective   Chief Complaint   Patient presents with    Gynecologic Exam     Last pap done 24-WNL, No complaints       Verenice Olvera is a 24 y.o. year old  presenting to be seen for her annual gynecological exam.   No complaints or concerns  Using condoms for contraception  Getting  in   ..Patient's last menstrual period was 2025.     Past Medical History:   Diagnosis Date    Abnormal Pap smear of cervix     Acne     HPV (human papilloma virus) infection         Current Outpatient Medications:     ISOtretinoin (ACCUTANE) 40 MG capsule, Take 1 capsule by mouth Daily As Needed., Disp: , Rfl:     spironolactone (ALDACTONE) 50 MG tablet, , Disp: , Rfl:    No Known Allergies   Past Surgical History:   Procedure Laterality Date    CERVICAL BIOPSY  W/ LOOP ELECTRODE EXCISION  23    LEEP N/A 2023    Procedure: LOOP ELECTROCAUTERY EXCISION PROCEDURE;  Surgeon: Maria A Alfredo MD;  Location: [facility];  Service: Obstetrics/Gynecology;  Laterality: N/A;    WISDOM TOOTH EXTRACTION  14      Social History     Socioeconomic History    Marital status: Single   Tobacco Use    Smoking status: Never    Smokeless tobacco: Never   Vaping Use    Vaping status: Never Used   Substance and Sexual Activity    Alcohol use: Never    Drug use: Never    Sexual activity: Yes     Partners: Male     Birth control/protection: Condom      Family History   Problem Relation Age of Onset    No Known Problems Father     No Known Problems Mother        Review of Systems   Constitutional: Negative.    Gastrointestinal: Negative.    Genitourinary: Negative.            Objective   /70   Ht 160 cm (63\")   Wt 61.1 kg (134 lb 9.6 oz)   LMP 2025   BMI 23.84 kg/m²     Physical Exam  Exam conducted with a chaperone present.   Constitutional:       Appearance: Normal appearance. She is well-developed and well-groomed.   Eyes:      General: Lids are normal.      Extraocular Movements: Extraocular " movements intact.      Conjunctiva/sclera: Conjunctivae normal.   Chest:   Breasts:     Breasts are symmetrical.      Right: No inverted nipple, mass, nipple discharge, skin change or tenderness.      Left: No inverted nipple, mass, nipple discharge, skin change or tenderness.   Abdominal:      General: There is no distension.      Palpations: Abdomen is soft.      Tenderness: There is no abdominal tenderness.   Genitourinary:     Exam position: Lithotomy position.      Labia:         Right: No rash, tenderness or lesion.         Left: No rash, tenderness or lesion.       Urethra: No prolapse, urethral pain, urethral swelling or urethral lesion.      Vagina: No vaginal discharge, tenderness or lesions.      Cervix: No cervical motion tenderness, discharge, friability or lesion.      Uterus: Not enlarged and not tender.       Adnexa:         Right: No mass or tenderness.          Left: No mass or tenderness.     Musculoskeletal:      Cervical back: Neck supple.   Lymphadenopathy:      Upper Body:      Right upper body: No axillary adenopathy.      Left upper body: No axillary adenopathy.   Skin:     General: Skin is warm and dry.      Findings: No lesion.   Neurological:      General: No focal deficit present.      Mental Status: She is alert and oriented to person, place, and time.   Psychiatric:         Attention and Perception: Attention normal.         Mood and Affect: Mood normal.         Speech: Speech normal.         Behavior: Behavior normal.         Thought Content: Thought content normal.            Result Review :                   Assessment and Plan  Diagnoses and all orders for this visit:    1. Well woman exam with routine gynecological exam (Primary)    2. Screening for cervical cancer  -     LIQUID-BASED PAP SMEAR WITH HPV GENOTYPING IF ASCUS ([facility])      Patient Instructions   Self breast exam monthly  Regular exercise             This note was electronically signed.    Nika Mcdonnell PA-C    April 21, 2025

## 2025-04-23 LAB — REF LAB TEST METHOD: NORMAL

## (undated) DEVICE — COUNT NDL FOAM STRIP W/MAG 60CT

## (undated) DEVICE — LASAR SWABS: Brand: DEROYAL

## (undated) DEVICE — LP MEGADYNE LLETZ W/HNDPC 10X7MM ORNG

## (undated) DEVICE — RICH MINOR LITHOTOMY: Brand: MEDLINE INDUSTRIES, INC.

## (undated) DEVICE — SOL IRR H2O BTL 1000ML STRL

## (undated) DEVICE — ELECTRD BALL EZ CLN STD 5IN

## (undated) DEVICE — TBG PENCL TELESCP MEGADYNE SMOKE EVAC 10FT

## (undated) DEVICE — GLV SURG BIOGEL M LTX PF 6 1/2

## (undated) DEVICE — PATIENT RETURN ELECTRODE, SINGLE-USE, CONTACT QUALITY MONITORING, ADULT, WITH 9FT CORD, FOR PATIENTS WEIGING OVER 33LBS. (15KG): Brand: MEGADYNE

## (undated) DEVICE — TUBING, SUCTION, 1/4" X 12', STRAIGHT: Brand: MEDLINE

## (undated) DEVICE — FLEXIBLE YANKAUER,MEDIUM TIP, NO VACUUM CONTROL: Brand: ARGYLE